# Patient Record
Sex: FEMALE | Race: WHITE
[De-identification: names, ages, dates, MRNs, and addresses within clinical notes are randomized per-mention and may not be internally consistent; named-entity substitution may affect disease eponyms.]

---

## 2022-11-13 ENCOUNTER — HOSPITAL ENCOUNTER (EMERGENCY)
Dept: HOSPITAL 97 - ER | Age: 42
Discharge: HOME | End: 2022-11-13
Payer: COMMERCIAL

## 2022-11-13 VITALS — SYSTOLIC BLOOD PRESSURE: 115 MMHG | OXYGEN SATURATION: 100 % | DIASTOLIC BLOOD PRESSURE: 74 MMHG | TEMPERATURE: 98.5 F

## 2022-11-13 DIAGNOSIS — S93.621A: Primary | ICD-10-CM

## 2022-11-13 PROCEDURE — 99284 EMERGENCY DEPT VISIT MOD MDM: CPT

## 2022-11-13 NOTE — XMS REPORT
Continuity of Care Document

                          Created on:2022



Patient:NOVA BRIGGS

Sex:Female

:1980

External Reference #:[id number]





Demographics







                          Address                   [address] [place] [address]

 

                          Home Phone                [phone]

 

                          Work Phone                [phone]

 

                          Mobile Phone              [phone]

 

                          Email Address             [email or web address]

 

                          Preferred Language        English

 

                          Marital Status            Unknown

 

                          Latter day Affiliation     Unknown

 

                          Race                      Unknown

 

                          Ethnic Group              Not  or 









Author







                          Organization              [facility]

t

 

                          Address                   [address]



                                                    [place] [address]

 

                          Phone                     [phone]









Support







                Name            Relationship    Address         Phone

 

                LINDSAY DE ANDA              [address] [phone]



                                                [place] [address] 

 

                LINDSAY DE ANDA              [address] [phone]



                                                [place] [address] 









Care Team Providers







                    Name                Role                Phone

 

                    Silvia Alex  Primary Care Physician [phone]

 

                    Olya Ledesma     Attending Clinician Unavailable

 

                    Only, Anthony Db Test   Attending Clinician Unavailable

 

                    Unknown, Attending  Attending Clinician Unavailable

 

                    Silvia Alex  Attending Clinician [phone]

 

                    Lab, Ad[facility] Fam P[facility] I  Attending Clinician Unavailable

 

                    SILVIA BABIN      Attending Clinician Unavailable

 

                    Provider, [facility] Attending Clinician Unavailable

 

                    Olya eLdesma     Admitting Clinician Unavailable









Payers







           Payer Name Policy Type Policy     Effective Date Expiration Date Sour

ce



                                 Number                           

 

           BCBS OF               [id number] 2017            [place]

f



           [facility]                       00:[facility]              [facility]

[id number]                                             [facility]



           -[facility]                                             



           [phone]                                             



           [address]                                                



           [id number] [place] [address] [id number]                                             







Problems







       Condition Condition Condition Status Onset  Resolution Last   Treating Co

mments 

Source



       Name   Details Category        Date   Date   Treatment Clinician        



                                                 Date                 

 

       No known No known Disease                                           Unive

rs



       active active                                                  ity of



       problems problems                                                  [facility]







Allergies, Adverse Reactions, Alerts







       Allergy Allergy Status Severity Reaction(s) Onset  Inactive Treating Comm

ents 

Source



       Name   Type                        Date   Date   Clinician        

 

       No Known DA     Active U                                   [facility]



       Allergie                             -14                        [place]



       s                                  00:00:                      d



                                          00                          [facility]

 

       NO KNOWN Drug   Active                                           [facility]



       ALLERGIE Class                                                   ity of



       S                                                              [facility]







Social History







           Social Habit Start Date Stop Date  Quantity   Comments   Source

 

           Exposure to                       Not sure              [facility]



           SARS-CoV-2 (event)                                             [facility]

 

           Cigarettes smoked 2021                       Univers

ity of



           current (pack per 00:00:00   00:00:00                         [facility]

edical



           day) - Reported                                             Branch

 

           Tobacco use and 2021 Current user            Univers

ity of



           exposure   00:00:00   00:00:00                         [facility]

 

           History of tobacco            2020 Cigarette Smoker            

University of



           use                   00:00:00                         [facility]

 

           Sex Assigned At 1980                       Universit

y of



           Birth      00:00:00   00:00:00                         [facility]









                Smoking Status  Start Date      Stop Date       Source

 

                Former smoker   2021 00:00:00 2021 00:00:00 Universi

ty [facility]

 

                Unknown if ever smoked                                 [facility]

y [facility]







Medications







       Ordered Filled Start  Stop   Current Ordering Indication Dosage Frequency

 Signature

                    Comments            Components          Source



     Medication Medication Date Date Medication? Clinician                (SIG) 

          



     Name Name                                                   

 

     escitalopra            Yes            20mg      Take 20 mg           

Univers



     m oxalate      3-11                               by mouth           ity of



     20 mg      14:08:                               daily.           [place]



     tablet      57                                                [facility]

 

     escitalopra            Yes            20mg      Take 20 mg           

Univers



     m oxalate      3-11                               by mouth           ity of



     20 mg      14:08:                               daily.           [place]



     tablet      57                                                [facility]

 

     escitalopra            Yes            20mg      Take 20 mg           

Univers



     m oxalate      3-11                               by mouth           ity of



     20 mg      14:08:                               daily.           [place]



     tablet      57                                                [facility]

 

     escitalopra            Yes            20mg      Take 20 mg           

Univers



     m oxalate      3-11                               by mouth           ity of



     20 mg      14:08:                               daily.           [place]



     tablet      57                                                [facility]

 

     escitalopra            Yes            20mg      Take 20 mg           

Univers



     m oxalate      3-11                               by mouth           ity of



     20 mg      14:08:                               daily.           [place]



     tablet      57                                                [facility]

 

     escitalopra            Yes            20mg      Take 20 mg           

Univers



     m oxalate      3-11                               by mouth           ity of



     20 mg      14:08:                               daily.           [place]



     tablet      57                                                [facility]

 

     escitalopra            Yes            20mg      Take 20 mg           

Univers



     m oxalate      3-11                               by mouth           ity of



     20 mg      14:08:                               daily.           [place]



     tablet      57                                                Medical



                                                                 Branch

 

     escitalopra            Yes            20mg      Take 20 mg           

Univers



     m oxalate      3-11                               by mouth           ity of



     20 mg      14:08:                               daily.           [place]



     tablet      57                                                Medical



                                                                 Branch

 

     levonorgest      2020-0      Yes                                     Univer

s



     reL 20      6-10                                              ity of



     mcg/24      00:00:                                              [place]



     hours (                                                Medical



     yrs) 52 mg                                                        Branch



     IUD                                                         

 

     levonorgest      2020-0      Yes                                     Univer

s



     reL 20      6-10                                              ity of



     mcg/24      00:00:                                              [place]



     hours (                                                Medical



     yrs) 52 mg                                                        Branch



     IUD                                                         

 

     levonorgest      2020-0      Yes                                     Univer

s



     reL 20      6-10                                              ity of



     mcg/24      00:00:                                              [place]



     hours (                                                Medical



     yrs) 52 mg                                                        Branch



     IUD                                                         

 

     levonorgest      2020-0      Yes                                     Univer

s



     reL 20      6-10                                              ity of



     mcg/24      00:00:                                              [place]



     hours (                                                Medical



     yrs) 52 mg                                                        Branch



     IUD                                                         

 

     levonorgest      2020-0      Yes                                     Univer

s



     reL 20      6-10                                              ity of



     mcg/24      00:00:                                              [place]



     hours (                                                Medical



     yrs) 52 mg                                                        Branch



     IUD                                                         

 

     levonorgest      2020-0      Yes                                     Univer

s



     reL 20      6-10                                              ity of



     mcg/24      00:00:                                              [place]



     hours (                                                Medical



     yrs) 52 mg                                                        Branch



     IUD                                                         

 

     levonorgest      2020-0      Yes                                     Univer

s



     reL 20      6-10                                              ity of



     mcg/24      00:00:                                              [place]



     hours (                                                Medical



     yrs) 52 mg                                                        Branch



     IUD                                                         

 

     levonorgest      2020-0      Yes                                     Univer

s



     reL 20      6-10                                              ity of



     mcg/24      00:00:                                              [place]



     hours (                                                Medical



     yrs) 52 mg                                                        Branch



     IUD                                                         

 

     No known                No                                      [facility]



     medications                                                        [facility]







Vital Signs







             Vital Name   Observation Time Observation Value Comments     Source

 

             Systolic blood 2021 14:07:00 105 mm[Hg]                Univer

sity of



             pressure                                            [facility]

 

             Diastolic blood 2021 14:07:00 75 mm[Hg]                 Unive

rsity of



             pressure                                            [facility]

 

             Heart rate   2021 14:07:00 87 /min                   [facility]

 

             Body temperature 2021 14:07:00 36.89 Mary Anne                 Univ

ersity [facility]

 

             Body height  2021 14:07:00 152.4 cm                  [facility]

 

             Body weight  2021 14:07:00 57.153 kg                 Universi

ty of



                                                                 [place] [facility]



                                                                 [place]

 

             BMI          2021 14:07:00 24.61 kg/m2               Universi

ty [facility]

 

             Systolic blood 2020 19:13:00 134 mm[Hg]                Univer

sity of



             pressure                                            [facility]

 

             Diastolic blood 2020 19:13:00 87 mm[Hg]                 Unive

rsity of



             pressure                                            [facility]

 

             Heart rate   2020 19:13:00 105 /min                  Universi

ty [facility]

 

             Respiratory rate 2020 19:13:00 16 /min                   Univ

ersity of



                                                                 [facility]

 

             Body height  2020 19:13:00 152.4 cm                  Universi

[facility]

 

             Body weight  2020 19:13:00 72.576 kg                 Universi

[facility]

 

             BMI          2020 19:13:00 31.25 kg/m2               Universi

[facility]

 

             Oxygen saturation in 2020 19:13:00 97 /min                   

[facility]



             Arterial blood by                                        [place] [facility]

effie



             Pulse oximetry                                        Branch







Procedures







                Procedure       Date / Time     Performing Clinician Source



                                Performed                       

 

                COMP. METABOLIC PANEL 2021 15:06:00 Silvia Babin  [facility]



                ([id number])                                         Medical [place]

 

                CBC WITH DIFF   2021 15:06:00 Selena [facility]

 

                VITAMIN B12, LEVEL 2021 15:06:00 Silvia Babin  [facility]

 

                THYROID STIMULATING 2021 15:06:00 Selena Silvia  [facility]

[facility] [place]



                HORMONE                                         [facility]

 

                LIPID PANEL     2021 15:06:00 Selena [facility]



                ([id number])(TOTAL                                   Medical Branch



                CHOLESTEROL,                                    



                TRIGLYCERIDES, HDL)                                 

 

                VITAMIN D, 25-OH 2021 15:06:00 [facility]







Encounters







        Start   End     Encounter Admission Attending Care    Care    Encounter 

Source



        Date/Time Date/Time Type    Type    Clinicians Facility Department ID   

   

 

        2022 Outpatient MAGI Urias    [id number]

[id number] [facility]



        08:00:00 08:00:00                 Olyamarielos Medel      [facility]

 

        2021 Laboratory         Only, Ang Db Test [facility]    1.2.8

[id number] 

Univers



        12:43:16 12:53:16 Only            Unknown, Attending Health  [id number]

         ity of



                                                [place] [id number]         Manpreet

as



                                                Rex?Blea [phone]         Me

renea farmer    [address]             [facility]                 



                                                Office                  



                                                Building                 

 

        2021 Outpatient R               [facility]    [id number]

035 Univers



        12:45:00 12:45:00                                                 ity of



                                                                        [facility]

 

        2021 Outpatient ZAYRA      Ledesma, [facility]    [id number]

[id number] [facility]



        12:00:00 12:00:00                 [place] [id number]      Woman'

s



                                                                        [facility]

 

        2021-04-15 2021-04-15 Patient         Selena,  [facility]    [id number]

03 Univers



        00:00:00 00:00:00 Secure Msg         Silvia [facility]  [id number]        

 ity of



                                                [place] [id number]         Manpreet

as



                                                Professio [phone]         Me

dical



                                                nal     [address]             [place]



                                                Office                  



                                                Building                 



                                                One                     

 

        2021 Technician         Lab, [facility] F[facility] P[facility] I [facility]    1.2.

[id number] 

Univers



        08:00:38 08:10:59 Visit           Silvia [facility]  [id number]    

     ity of



                                                [place] [id number]         Manpreet

as



                                                Professio [phone]         Me

dical



                                                nal     [address]             [place]



                                                Office                  



                                                Building                 



                                                One                     

 

        2021 Outpatient R       SELENA,  [facility]    [id number]

298 Univers



        08:00:00 08:00:00                 SILVIA                         ity of



                                                                        [facility]

 

        2021 Technician         Lab, [facility] Fam Pob I [facility]    1.2.

[id number] 

Univers



        08:49:59 09:11:22 Visit           Silvia [facility]  [id number]    

     ity of



                                                [place] [id number]         Manpreet

as



                                                Professio [phone]         Me

dical



                                                nal     [address]             [place]



                                                Office                  



                                                Building                 



                                                One                     

 

        2021 Office          Selena  [facility]    [id number]

14 Univers



        08:02:54 08:50:14 Visit           Silvia Snyder  350.1.13.10         it

y of



                                                [place] [id number]         Manpreet

as



                                                Professio [phone]         Me

dical



                                                nal     [address]             [place]



                                                Office                  



                                                [facility]                 



                                                One                     

 

        2021 [facility]RIANNA       [facility]    [id number]

838 Univers



        08:00:00 08:00:00                 SILVIA barrera [facility]

 

        [facility]    [id number]

646 Univers



        10:00:00 10:00:00                 SILVIA                         irvin [facility]

 

        2020 Urgent          Provider, [facility]    

[id number] 

[id number]                                Univers



        13:01:19 13:42:24 Care            Silvia Babin  350.1.13.10    

     ity rosalee



                                                [place] [id number]         Manpreet

as



                                                Professio [phone]         Me

dical



                                                nal     [address]             [place]



                                                Office                  



                                                [facility]                 



                                                One                     

 

        2020 [facility]    [id number]

975 Univers



        13:00:00 13:00:00                 [facility]







Results







           Test Description Test Time  Test Comments Results    Result Comments 

Source









                    VITAMIN B12, LEVEL  2021 22:47:10 









                      Test Item  Value      Reference Range Interpretation Comme

nts









             VIT B12 (test code = 9642947650) 713 pg/mL    240-930              

     

 

             KIERRA (test code = KIERRA) Biotin has been reported to cause            

               



                          a positive bias, interpret results                    

       



                          relative to patient's use of                          

 



                          biotin.                                

 

             Lab Interpretation (test code = Normal                             

    



             72558-6)                                            



[facility] 95-PI0060-70-11 21:31:26





             Test Item    Value        Reference Range Interpretation Comments

 

             VIT D 25OH (test code = 52 ng/mL     25-80                     



             01443-0)                                            

 

             KIERRA (test code = KIERRA) Deficiency: <20                           



                          ng/mLInsufficiency                           



                          : 20-24                                



                          ng/mLOptimal:                           



                          25-80 ng/mL                            

 

             Lab Interpretation (test Normal                                 



             code = 05411-7)                                        



[facility] [id number] 17:35:59





             Test Item    Value        Reference Range Interpretation Comments

 

             TSH (test code =              See_Comment  L             [Automated

 message]



             [id number])                                         The system Software 2000



                                                                 generated this 

result



                                                                 transmitted ref

erence



                                                                 range: 0.45 - 4

.70



                                                                 mIU/L. The refe

rence



                                                                 range was not u

sed to



                                                                 interpret this 

result



                                                                 as normal/abnor

mal.

 

             Lab Interpretation (test Abnormal                               



             code = 09552-9)                                        



[facility]. METABOLIC PANEL ([id number])2021 
17:14:57





             Test Item    Value        Reference Range Interpretation Comments

 

             NA (test code = 139 mmol/L   135-145                   



             9919280023)                                         

 

             K (test code = 4.6 mmol/L   3.5-5.0                   



             9004545983)                                         

 

             CL (test code = 99 mmol/L                        



             8108810197)                                         

 

             CO2 TOTAL (test code = 31 mmol/L    23-31                     



             0765750109)                                         

 

             AGAP (test code =              2-16                      



             5663741861)                                         

 

             BUN (test code = 7 mg/dL      7-23                      



             9639252565)                                         

 

             GLUCOSE (test code = 80 mg/dL                         



             4067727566)                                         

 

             CREATININE (test code = 0.49 mg/dL   0.50-1.04    L            



             3862357070)                                         

 

             TOTAL BILI (test code = 1.1 mg/dL    0.1-1.1                   



             3836285452)                                         

 

             CALCIUM (test code = 9.6 mg/dL    8.6-10.6                  



             5536593841)                                         

 

             T PROTEIN (test code = 7.0 g/dL     6.3-8.2                   



             1074999705)                                         

 

             ALBUMIN (test code = 4.5 g/dL     3.5-5.0                   



             7643861480)                                         

 

             ALK PHOS (test code = 87 U/L                           



             2860409443)                                         

 

             ALTv (test code = 26 U/L       5-35                      



             1742-6)                                             

 

             AST(SGOT) (test code = 29 U/L       13-40                     



             7868188509)                                         

 

             eGFR Calculation              mL/min/1.73m2              



             (Non-)                                        



             (test code =                                        



             1389626772)                                         

 

             eGFR Calculation              mL/min/1.73m2              



             (African American)                                        



             (test code =                                        



             9444452823)                                         

 

             KIERRA (test code = KIERRA) Association of                           



                          Glomerular Filtration                           



                          Rate (GFR) and Staging                           



                          of Kidney Disease*                           



                          +---------------------                           



                          --+-------------------                           



                          --+-------------------                           



                          ------+| GFR                           



                          (mL/min/1.73 m2) ?|                           



                          With Kidney Damage ?|                           



                          ?Without Kidney                           



                          Damage+---------------                           



                          --------+-------------                           



                          --------+-------------                           



                          ------------+| ?>90 ?                           



                          ? ? ? ? ? ? ? ?|                           



                          ?Stage one ? ? ? ? ?|                           



                          ? Normal ? ? ? ? ? ? ?                           



                          ?+--------------------                           



                          ---+------------------                           



                          ---+------------------                           



                          -------+| ?60-89 ? ? ?                           



                          ? ? ? ? ?| ?Stage two                           



                          ? ? ? ? ?| ? Decreased                           



                          GFR ? ? ? ?                            



                          +---------------------                           



                          --+-------------------                           



                          --+-------------------                           



                          ------+| ?30-59 ? ? ?                           



                          ? ? ? ? ?| ?Stage                           



                          three ? ? ? ?| ? Stage                           



                          three ? ? ? ? ?                           



                          +---------------------                           



                          --+-------------------                           



                          --+-------------------                           



                          ------+| ?15-29 ? ? ?                           



                          ? ? ? ? ?| ?Stage four                           



                          ? ? ? ? | ? Stage four                           



                          ? ? ? ? ?                              



                          ?+--------------------                           



                          ---+------------------                           



                          ---+------------------                           



                          -------+| ?<15 (or                           



                          dialysis) ? ?| ?Stage                           



                          five ? ? ? ? | ? Stage                           



                          five ? ? ? ? ?                           



                          ?+--------------------                           



                          ---+------------------                           



                          ---+------------------                           



                          -------+ *Each stage                           



                          assumes the associated                           



                          GFR level has been in                           



                          effect for at least                           



                          three months. ?Stages                           



                          1 to 5, with or                           



                          without kidney                           



                          disease, indicate                           



                          chronic kidney                           



                          disease. Notes:                           



                          Determination of                           



                          stages one and two                           



                          (with eGFR                             



                          >59mL/min/1.73 m2)                           



                          requires estimation of                           



                          kidney damage for at                           



                          least three months as                           



                          defined by structural                           



                          or functional                           



                          abnormalities of the                           



                          kidney, manifested by                           



                          either:Pathological                           



                          abnormalities or                           



                          Markers of kidney                           



                          damage (including                           



                          abnormalities in the                           



                          composition of the                           



                          blood or urine or                           



                          abnormalities in                           



                          imaging tests).                           

 

             Lab Interpretation Abnormal                               



             (test code = 31381-6)                                        



[facility]. METABOLIC PANEL ([id number])2021 
17:14:57





             Test Item    Value        Reference Range Interpretation Comments

 

             NA (test code = 139 mmol/L   135-145                   



             5435227784)                                         

 

             K (test code = 4.6 mmol/L   3.5-5.0                   



             8872038029)                                         

 

             CL (test code = 99 mmol/L                        



             6775661650)                                         

 

             CO2 TOTAL (test code = 31 mmol/L    23-31                     



             5351985177)                                         

 

             AGAP (test code =              2-16                      



             0632292242)                                         

 

             BUN (test code = 7 mg/dL      7-23                      



             4261475707)                                         

 

             GLUCOSE (test code = 80 mg/dL                         



             5260793621)                                         

 

             CREATININE (test code = 0.49 mg/dL   0.50-1.04    L            



             4013394368)                                         

 

             TOTAL BILI (test code = 1.1 mg/dL    0.1-1.1                   



             4716062860)                                         

 

             CALCIUM (test code = 9.6 mg/dL    8.6-10.6                  



             9071173047)                                         

 

             T PROTEIN (test code = 7.0 g/dL     6.3-8.2                   



             6091955893)                                         

 

             ALBUMIN (test code = 4.5 g/dL     3.5-5.0                   



             8981041670)                                         

 

             ALK PHOS (test code = 87 U/L                           



             5116760150)                                         

 

             ALTv (test code = 26 U/L       5-35                      



             1742-6)                                             

 

             AST(SGOT) (test code = 29 U/L       13-40                     



             5819624522)                                         

 

             eGFR Calculation              mL/min/1.73m2              



             (Non-)                                        



             (test code =                                        



             1130400912)                                         

 

             eGFR Calculation              mL/min/1.73m2              



             (African American)                                        



             (test code =                                        



             4520008422)                                         

 

             KIERRA (test code = KIERRA) Association of                           



                          Glomerular Filtration                           



                          Rate (GFR) and Staging                           



                          of Kidney Disease*                           



                          +---------------------                           



                          --+-------------------                           



                          --+-------------------                           



                          ------+| GFR                           



                          (mL/min/1.73 m2) ?|                           



                          With Kidney Damage ?|                           



                          ?Without Kidney                           



                          Damage+---------------                           



                          --------+-------------                           



                          --------+-------------                           



                          ------------+| ?>90 ?                           



                          ? ? ? ? ? ? ? ?|                           



                          ?Stage one ? ? ? ? ?|                           



                          ? Normal ? ? ? ? ? ? ?                           



                          ?+--------------------                           



                          ---+------------------                           



                          ---+------------------                           



                          -------+| ?60-89 ? ? ?                           



                          ? ? ? ? ?| ?Stage two                           



                          ? ? ? ? ?| ? Decreased                           



                          GFR ? ? ? ?                            



                          +---------------------                           



                          --+-------------------                           



                          --+-------------------                           



                          ------+| ?30-59 ? ? ?                           



                          ? ? ? ? ?| ?Stage                           



                          three ? ? ? ?| ? Stage                           



                          three ? ? ? ? ?                           



                          +---------------------                           



                          --+-------------------                           



                          --+-------------------                           



                          ------+| ?15-29 ? ? ?                           



                          ? ? ? ? ?| ?Stage four                           



                          ? ? ? ? | ? Stage four                           



                          ? ? ? ? ?                              



                          ?+--------------------                           



                          ---+------------------                           



                          ---+------------------                           



                          -------+| ?<15 (or                           



                          dialysis) ? ?| ?Stage                           



                          five ? ? ? ? | ? Stage                           



                          five ? ? ? ? ?                           



                          ?+--------------------                           



                          ---+------------------                           



                          ---+------------------                           



                          -------+ *Each stage                           



                          assumes the associated                           



                          GFR level has been in                           



                          effect for at least                           



                          three months. ?Stages                           



                          1 to 5, with or                           



                          without kidney                           



                          disease, indicate                           



                          chronic kidney                           



                          disease. Notes:                           



                          Determination of                           



                          stages one and two                           



                          (with eGFR                             



                          >59mL/min/1.73 m2)                           



                          requires estimation of                           



                          kidney damage for at                           



                          least three months as                           



                          defined by structural                           



                          or functional                           



                          abnormalities of the                           



                          kidney, manifested by                           



                          either:Pathological                           



                          abnormalities or                           



                          Markers of kidney                           



                          damage (including                           



                          abnormalities in the                           



                          composition of the                           



                          blood or urine or                           



                          abnormalities in                           



                          imaging tests).                           

 

             Lab Interpretation Abnormal                               



             (test code = 60557-0)                                        



[id number]TOTAL CHOLESTEROL, 
TRIGLYCERIDES, HDL)2021 17:14:57





             Test Item    Value        Reference Range Interpretation Comments

 

             CHOL (test code = 221 mg/dL    120-200      H            



             7409769884)                                         

 

             HDL (test code = 49 mg/dL     >50          L            



             2877319836)                                         

 

             HDLC RATIO (test code =              See_Comment                [Au

tomated message]



             2354913700)                                         The system Software 2000



                                                                 generated this



                                                                 result transmit

balta



                                                                 reference range

:



                                                                 <=4.5. The refe

rence



                                                                 range was not u

sed



                                                                 to interpret th

is



                                                                 result as



                                                                 normal/abnormal

.

 

             TRIG (test code = 124 mg/dL                        



             9095649496)                                         

 

             LDL CHOL (test code = 147 mg/dL    See_Comment                [Auto

mated message]



             30529-1)                                            The system Software 2000



                                                                 generated this



                                                                 result transmit

balta



                                                                 reference range

:



                                                                 <=160. The refe

rence



                                                                 range was not u

sed



                                                                 to interpret th

is



                                                                 result as



                                                                 normal/abnormal

.

 

             VLDL (test code = 25 mg/dL     5-60                      



             6466285613)                                         

 

             Lab Interpretation (test Abnormal                               



             code = 96967-8)                                        



[id number] 16:54:15





             Test Item    Value        Reference Range Interpretation Comments

 

             WBC (test code =              See_Comment                [Automated



             4290-2)                                             message] The sy

stem



                                                                 which generated



                                                                 this result



                                                                 transmitted



                                                                 reference range

:



                                                                 4.30 - 11.10



                                                                 10*3/?L. The



                                                                 reference range

 was



                                                                 not used to



                                                                 interpret this



                                                                 result as



                                                                 normal/abnormal

.

 

             RBC (test code =              See_Comment                [Automated



             599-8)                                              message] The sy

stem



                                                                 which generated



                                                                 this result



                                                                 transmitted



                                                                 reference range

:



                                                                 3.93 - 5.25



                                                                 10*6/?L. The



                                                                 reference range

 was



                                                                 not used to



                                                                 interpret this



                                                                 result as



                                                                 normal/abnormal

.

 

             HGB (test code = 14.9 g/dL    11.6-15.0                 



             718-7)                                              

 

             HCT (test code = 45.3 %       35.7-45.2    H            



             4544-3)                                             

 

             MCV (test code = 91.0 fL      80.6-95.5                 



             787-2)                                              

 

             MCH (test code = 29.9 pg      25.9-32.8                 



             785-6)                                              

 

             MCHC (test code = 32.9 g/dL    31.6-35.1                 



             786-4)                                              

 

             RDW-SD (test code = 44.3 fL      39.0-49.9                 



             36227-0)                                            

 

             RDW-CV (test code = 13.2 %       12.0-15.5                 



             788-0)                                              

 

             PLT (test code =              See_Comment                [Automated



             777-3)                                              message] The sy

stem



                                                                 which generated



                                                                 this result



                                                                 transmitted



                                                                 reference range

:



                                                                 166 - 358 10*3/

?L.



                                                                 The reference r

olga



                                                                 was not used to



                                                                 interpret this



                                                                 result as



                                                                 normal/abnormal

.

 

             MPV (test code = 11.3 fL      9.5-12.9                  



             47642-1)                                            

 

             NRBC/100 WBC (test              See_Comment                [Automat

ed



             code = [id number])                                        message] 

The system



                                                                 which generated



                                                                 this result



                                                                 transmitted



                                                                 reference range

:



                                                                 0.0 - 10.0 /100



                                                                 WBCs. The refer

ence



                                                                 range was not u

sed



                                                                 to interpret th

is



                                                                 result as



                                                                 normal/abnormal

.

 

             NRBC x10^3 (test code <0.01        See_Comment                [Auto

mated



             = [id number])                                        message] The s

ystem



                                                                 which generated



                                                                 this result



                                                                 transmitted



                                                                 reference range

:



                                                                 10*3/?L. The



                                                                 reference range

 was



                                                                 not used to



                                                                 interpret this



                                                                 result as



                                                                 normal/abnormal

.

 

             GRAN MAT (NEUT) % 51.7 %                                 



             (test code = 770-8)                                        

 

             IMM GRAN % (test code 0.40 %                                 



             = [id number])                                        

 

             LYMPH % (test code = 39.3 %                                 



             736-9)                                              

 

             MONO % (test code = 5.7 %                                  



             5905-5)                                             

 

             EOS % (test code = 2.6 %                                  



             713-8)                                              

 

             BASO % (test code = 0.3 %                                  



             706-2)                                              

 

             GRAN MAT x10^3(ANC) 4.02 10*3/uL 1.88-7.09                 



             (test code =                                        



             [id number])                                         

 

             IMM GRAN x10^3 (test 0.03 10*3/uL 0.00-0.06                 



             code = [id number])                                        

 

             LYMPH x10^3 (test code 3.05 10*3/uL 1.32-3.29                 



             = 731-0)                                            

 

             MONO x10^3 (test code 0.44 10*3/uL 0.33-0.92                 



             = 742-7)                                            

 

             EOS x10^3 (test code = 0.20 10*3/uL 0.03-0.39                 



             711-2)                                              

 

             BASO x10^3 (test code <0.03        0.01-0.07                 



             = 704-7)                                            

 

             Lab Interpretation Abnormal                               



             (test code = 37811-9)                                        



[facility] WITH [id number] 16:54:15





             Test Item    Value        Reference Range Interpretation Comments

 

             WBC (test code =              See_Comment                [Automated



             6690-2)                                             message] The sy

stem



                                                                 which generated



                                                                 this result



                                                                 transmitted



                                                                 reference range

:



                                                                 4.30 - 11.10



                                                                 10*3/?L. The



                                                                 reference range

 was



                                                                 not used to



                                                                 interpret this



                                                                 result as



                                                                 normal/abnormal

.

 

             RBC (test code =              See_Comment                [Automated



             789-8)                                              message] The sy

stem



                                                                 which generated



                                                                 this result



                                                                 transmitted



                                                                 reference range

:



                                                                 3.93 - 5.25



                                                                 10*6/?L. The



                                                                 reference range

 was



                                                                 not used to



                                                                 interpret this



                                                                 result as



                                                                 normal/abnormal

.

 

             HGB (test code = 14.9 g/dL    11.6-15.0                 



             718-7)                                              

 

             HCT (test code = 45.3 %       35.7-45.2    H            



             4544-3)                                             

 

             MCV (test code = 91.0 fL      80.6-95.5                 



             787-2)                                              

 

             MCH (test code = 29.9 pg      25.9-32.8                 



             785-6)                                              

 

             MCHC (test code = 32.9 g/dL    31.6-35.1                 



             786-4)                                              

 

             RDW-SD (test code = 44.3 fL      39.0-49.9                 



             63641-2)                                            

 

             RDW-CV (test code = 13.2 %       12.0-15.5                 



             788-0)                                              

 

             PLT (test code =              See_Comment                [Automated



             777-3)                                              message] The sy

stem



                                                                 which generated



                                                                 this result



                                                                 transmitted



                                                                 reference range

:



                                                                 166 - 358 10*3/

?L.



                                                                 The reference r

olga



                                                                 was not used to



                                                                 interpret this



                                                                 result as



                                                                 normal/abnormal

.

 

             MPV (test code = 11.3 fL      9.5-12.9                  



             68154-3)                                            

 

             NRBC/100 WBC (test              See_Comment                [Automat

ed



             code = [id number])                                        message] 

The system



                                                                 which generated



                                                                 this result



                                                                 transmitted



                                                                 reference range

:



                                                                 0.0 - 10.0 /100



                                                                 WBCs. The refer

ence



                                                                 range was not u

sed



                                                                 to interpret th

is



                                                                 result as



                                                                 normal/abnormal

.

 

             NRBC x10^3 (test code <0.01        See_Comment                [Auto

mated



             = [id number])                                        message] The s

ystem



                                                                 which generated



                                                                 this result



                                                                 transmitted



                                                                 reference range

:



                                                                 10*3/?L. The



                                                                 reference range

 was



                                                                 not used to



                                                                 interpret this



                                                                 result as



                                                                 normal/abnormal

.

 

             GRAN MAT (NEUT) % 51.7 %                                 



             (test code = 770-8)                                        

 

             IMM GRAN % (test code 0.40 %                                 



             = [id number])                                        

 

             LYMPH % (test code = 39.3 %                                 



             736-9)                                              

 

             MONO % (test code = 5.7 %                                  



             5905-5)                                             

 

             EOS % (test code = 2.6 %                                  



             713-8)                                              

 

             BASO % (test code = 0.3 %                                  



             706-2)                                              

 

             GRAN MAT x10^3(ANC) 4.02 10*3/uL 1.88-7.09                 



             (test code =                                        



             9350110093)                                         

 

             IMM GRAN x10^3 (test 0.03 10*3/uL 0.00-0.06                 



             code = [id number])                                        

 

             LYMPH x10^3 (test code 3.05 10*3/uL 1.32-3.29                 



             = 731-0)                                            

 

             MONO x10^3 (test code 0.44 10*3/uL 0.33-0.92                 



             = 742-7)                                            

 

             EOS x10^3 (test code = 0.20 10*3/uL 0.03-0.39                 



             711-2)                                              

 

             BASO x10^3 (test code <0.03        0.01-0.07                 



             = 704-7)                                            

 

             Lab Interpretation Abnormal                               



             (test code = 94582-7)                                        



[facility]

## 2022-11-13 NOTE — RAD REPORT
EXAM DESCRIPTION:  RAD - Foot Right 3 View - 11/13/2022 10:34 am

 

CLINICAL HISTORY:  PAIN

 

COMPARISON:  No comparisons

 

FINDINGS/IMPRESSION:  No acute fracture. No malalignment. No significant focal degenerative changes.

## 2022-11-13 NOTE — EDPHYS
Physician Documentation                                                                           

 [facility]                                                                 

Name: Thelma Haley                                                                             

Age: 42 yrs                                                                                       

Sex: Female                                                                                       

: 1980                                                                                   

MRN: [id number]                                                                                   

Arrival Date: 2022                                                                          

Time: 09:03                                                                                       

Account#: [id number]                                                                            

Bed 11                                                                                            

Private MD:                                                                                       

ASGRARIO Physician Ivan Bautista                                                                      

HPI:                                                                                              

                                                                                             

10:33 This 42 yrs old  Female presents to ER via Wheelchair with complaints of Foot  chastity 

      Pain - right.                                                                               

10:33 The patient presents with decreased range of motion, pain, that is acute. The           chastity 

      complaints affect the right foot, dorsum of right foot. Context: The problem was            

      sustained inside, resulted from the patient falling, while walking, Mechanism of            

      Injury: Inversion the patient can partially bear weight, the patient is able to             

      ambulate. Onset: The symptoms/episode began/occurred last night. Modifying factors: The     

      symptoms are alleviated by nothing, the symptoms are aggravated by weight bearing,          

      movement, wearing shoes. Associated signs and symptoms: Pertinent positives: swelling.      

      Severity of symptoms: At their worst the symptoms were moderate, in the emergency           

      department the symptoms are unchanged. The patient has not experienced similar symptoms     

      in the past.                                                                                

                                                                                                  

OB/GYN:                                                                                           

09:21 LMP N/A - Birth control method                                                          vg1 

                                                                                                  

Historical:                                                                                       

- Allergies:                                                                                      

09:21 No Known Allergies;                                                                     vg1 

- Home Meds:                                                                                      

09:21 Lexapro Oral [Active];                                                                  vg1 

- PMHx:                                                                                           

09:21 Anxiety; Depressive disorder;                                                           vg1 

- PSHx:                                                                                           

09:21 gastric;                                                                                vg1 

                                                                                                  

- Immunization history:: Client reports receiving the 2nd dose of the Covid vaccine.              

- Social history:: Smoking status: Patient reports the use of cigarette tobacco                   

  products, denies chronic smoking, but will smoke occasionally, Reported history of              

  juuling and/or vaping.                                                                          

- Family history:: not pertinent.                                                                 

                                                                                                  

                                                                                                  

ROS:                                                                                              

10:33 Constitutional: Negative for fever, chills, and weight loss, Eyes: Negative for injury, chastity 

      pain, redness, and discharge, ENT: Negative for injury, pain, and discharge, Neck:          

      Negative for injury, pain, and swelling, Cardiovascular: Negative for chest pain,           

      palpitations, and edema, Respiratory: Negative for shortness of breath, cough,              

      wheezing, and pleuritic chest pain, Abdomen/GI: Negative for abdominal pain, nausea,        

      vomiting, diarrhea, and constipation, Back: Negative for injury and pain, : Negative      

      for injury, bleeding, discharge, and swelling, Skin: Negative for injury, rash, and         

      discoloration, Neuro: Negative for headache, weakness, numbness, tingling, and seizure,     

      Psych: Negative for depression, anxiety, suicide ideation, homicidal ideation, and          

      hallucinations, Allergy/Immunology: Negative for hives, rash, and allergies, Endocrine:     

      Negative for neck swelling, polydipsia, polyuria, polyphagia, and marked weight             

      changes, Hematologic/Lymphatic: Negative for swollen nodes, abnormal bleeding, and          

      unusual bruising.                                                                           

10:33 MS/extremity: Positive for decreased range of motion, pain, swelling, tenderness, of        

      the dorsum of right foot.                                                                   

                                                                                                  

Exam:                                                                                             

10:33 Constitutional:  This is a well developed, well nourished patient who is awake, alert,  chastity 

      and in no acute distress. Head/Face:  Normocephalic, atraumatic. Eyes:  Pupils equal        

      round and reactive to light, extra-ocular motions intact.  Lids and lashes normal.          

      Conjunctiva and sclera are non-icteric and not injected.  Cornea within normal limits.      

      Periorbital areas with no swelling, redness, or edema. ENT:  Nares patent. No nasal         

      discharge, no septal abnormalities noted.  Tympanic membranes are normal and external       

      auditory canals are clear.  Oropharynx with no redness, swelling, or masses, exudates,      

      or evidence of obstruction, uvula midline.  Mucous membranes moist. Neck:  Trachea          

      midline, no thyromegaly or masses palpated, and no cervical lymphadenopathy.  Supple,       

      full range of motion without nuchal rigidity, or vertebral point tenderness.  No            

      Meningismus. Chest/axilla:  Normal chest wall appearance and motion.  Nontender with no     

      deformity.  No lesions are appreciated. Cardiovascular:  Regular rate and rhythm with a     

      normal S1 and S2.  No gallops, murmurs, or rubs.  Normal PMI, no JVD.  No pulse             

      deficits. Respiratory:  Lungs have equal breath sounds bilaterally, clear to                

      auscultation and percussion.  No rales, rhonchi or wheezes noted.  No increased work of     

      breathing, no retractions or nasal flaring. Abdomen/GI:  Soft, non-tender, with normal      

      bowel sounds.  No distension or tympany.  No guarding or rebound.  No evidence of           

      tenderness throughout. Back:  No spinal tenderness.  No costovertebral tenderness.          

      Full range of motion. Skin:  Warm, dry with normal turgor.  Normal color with no            

      rashes, no lesions, and no evidence of cellulitis. Neuro:  Awake and alert, GCS 15,         

      oriented to person, place, time, and situation.  Cranial nerves II-XII grossly intact.      

      Motor strength 5/5 in all extremities.  Sensory grossly intact.  Cerebellar exam            

      normal.  Normal gait. Psych:  Awake, alert, with orientation to person, place and time.     

       Behavior, mood, and affect are within normal limits.                                       

10:33 Musculoskeletal/extremity: ROM: limited active range of motion, limited passive range       

      of motion, Circulation is intact in all extremities. Sensation intact. Compartment          

      Syndrome exam of affected extremity: is normal. DVT Exam: negative Homans' sign noted       

      on exam, no appreciated bluish discoloration, no erythema, no increased warmth, pain,       

      swelling, tenderness.                                                                       

                                                                                                  

Vital Signs:                                                                                      

09:19  / 74; Pulse 99; Resp 17; Temp 98.5; Pulse Ox 100% ; Weight 54.43 kg; Height 5    vg1 

      ft. 0 in. (152.40 cm); Pain 7/10;                                                           

09:19 Body Mass Index 23.44 (54.43 kg, 152.40 cm)                                             vg1 

                                                                                                  

MDM:                                                                                              

09:23 Patient medically screened.                                                             chastity 

10:37 Differential diagnosis: fracture, sprain, arthritis. Data reviewed: vital signs, nurses [facility] 

      notes, radiologic studies, plain films. Data interpreted: Cardiac monitor: not              

      applicable for this patient encounter. rate is 99 beats/min, rhythm is regular, Pulse       

      oximetry: on room air is 100 %. Test interpretation: by ED physician or midlevel            

      provider: plain radiologic studies. Counseling: I had a detailed discussion with the        

      patient and/or guardian regarding: the historical points, exam findings, and any            

      diagnostic results supporting the discharge/admit diagnosis, lab results, radiology         

      results, the need for outpatient follow up, for definitive care, a orthopedic surgeon.      

                                                                                                  

                                                                                             

09:26 Order name: XRAY Foot RIGHT 3 View                                                      vg1 

                                                                                             

09:33 Order name: Ice pack; Complete Time: 09:42                                              chastity 

                                                                                             

10:33 Order name: Walking boot: short; Complete Time: 10:46                                   chastity 

                                                                                             

10:44 Order name: Crutches; Complete Time: 10:56                                              chastity 

                                                                                                  

Administered Medications:                                                                         

09:41 Drug: Motrin (ibuprofen) 400 mg Route: PO;                                              ss  

11:00 Follow up: Response: No adverse reaction                                                ss  

10:38 Drug: Norco (HYDROcodone-acetaminophen) 10 mg-325 mg 1 tabs Route: PO;                  ss  

10:56 Follow up: Response: Medication administered at discharge.                                

                                                                                                  

                                                                                                  

Disposition Summary:                                                                              

22 10:45                                                                                    

Discharge Ordered                                                                                 

      Location: Home                                                                          chastity 

      Problem: new                                                                            chastity 

      Symptoms: have improved                                                                 chastity 

      Condition: Stable                                                                       chastity 

      Diagnosis                                                                                   

        - Sprain of tarsometatarsal ligament of right foot                                    chastity 

        - Sprain of foot                                                                      chastity 

      Followup:                                                                               chastity 

        - With: Private Physician                                                                  

        - When: 2 - 3 days                                                                         

        - Reason: Recheck today's complaints, Continuance of care, Re-evaluation by your           

      physician                                                                                   

      Followup:                                                                               chastity 

        - With: Ashwin Kaur MD                                                              

        - When: 2 - 3 days                                                                         

        - Reason: Recheck today's complaints, Re-evaluation by your physician                      

      Discharge Instructions:                                                                     

        - Discharge Summary Sheet                                                             chastity 

        - Foot Sprain                                                                         chastity 

        - Foot Pain                                                                           chastity 

      Forms:                                                                                      

        - Medication Reconciliation Form                                                      chastity 

        - Thank You Letter                                                                    chastity 

        - Antibiotic Education                                                                chastity 

        - Prescription Opioid Use                                                             chastity 

      Prescriptions:                                                                              

        - Diclofenac Sodium 75 mg Oral tablet,delayed release (DR/EC)                              

            - take 1 tablet by ORAL route 2 times per day; 20 tablet; Refills: 0, Product     chastity 

      Selection Permitted                                                                         

        - Tylenol-Codeine #3 300 mg-30 mg Oral                                                     

            - take 2 tablet by ORAL route every 6 hours; 20 tablet; Refills: 0, Product       [facility] 

      Selection Permitted                                                                         

Signatures:                                                                                       

Dispatcher [employer]                           Ivan Hayes MD MD cha Smirch, Shelby, RN                      RN   ss                                                   

Rosa Sosa RN                    RN   vg1                                                  

                                                                                                  

**************************************************************************************************

## 2022-11-13 NOTE — ER
Nurse's Notes                                                                                     

 [facility]                                                                 

Name: Thelma Haley                                                                             

Age: 42 yrs                                                                                       

Sex: Female                                                                                       

: 1980                                                                                   

MRN: [id number]                                                                                   

Arrival Date: 2022                                                                          

Time: 09:03                                                                                       

Account#: [id number]                                                                            

Bed 11                                                                                            

Private MD:                                                                                       

Diagnosis: Sprain of tarsometatarsal ligament of right foot;Sprain of foot                        

                                                                                                  

Presentation:                                                                                     

                                                                                             

09:19 Chief complaint: Patient states: dancing last night around 0100 and rolled Right foot.  vg1 

      Appears to be swollen and warm. Coronavirus screen: Vaccine status: Patient reports         

      receiving the 2nd dose of the covid vaccine. Client denies travel out of the [place] in        

      the last 14 days. Ebola Screen: Patient negative for fever greater than or equal to         

      101.5 degrees Fahrenheit, and additional compatible Ebola Virus Disease symptoms.           

      Initial Sepsis Screen: Does the patient meet any 2 criteria? No. Patient's initial          

      sepsis screen is negative. Does the patient have a suspected source of infection? No.       

      Patient's initial sepsis screen is negative. Risk Assessment: Do you want to hurt           

      yourself or someone else? Patient reports no desire to harm self or others. Onset of        

      symptoms was 2022.                                                             

09:19 Method Of Arrival: Wheelchair                                                           vg1 

09:19 Acuity: NU 4                                                                           vg1 

                                                                                                  

Triage Assessment:                                                                                

09:21 General: Appears uncomfortable, Behavior is cooperative, anxious. Pain: Complains of    vg1 

      pain in right foot. Musculoskeletal: Capillary refill < 3 seconds, in bilateral toes.       

      Swelling present in right foot.                                                             

                                                                                                  

OB/GYN:                                                                                           

09:21 LMP N/A - Birth control method                                                          vg1 

                                                                                                  

Historical:                                                                                       

- Allergies:                                                                                      

09:21 No Known Allergies;                                                                     vg1 

- Home Meds:                                                                                      

09:21 Lexapro Oral [Active];                                                                  vg1 

- PMHx:                                                                                           

09:21 Anxiety; Depressive disorder;                                                           vg1 

- PSHx:                                                                                           

09:21 gastric;                                                                                vg1 

                                                                                                  

- Immunization history:: Client reports receiving the 2nd dose of the Covid vaccine.              

- Social history:: Smoking status: Patient reports the use of cigarette tobacco                   

  products, denies chronic smoking, but will smoke occasionally, Reported history of              

  juuling and/or vaping.                                                                          

- Family history:: not pertinent.                                                                 

                                                                                                  

                                                                                                  

Screenin:22 Abuse screen: Denies threats or abuse. Nutritional screening: No deficits noted.        vg1 

      Tuberculosis screening: No symptoms or risk factors identified. Fall Risk No fall in        

      past 12 months (0 pts). No secondary diagnosis (0 pts). No IV (0 pts). Ambulatory Aid-      

      None/Bed Rest/Nurse Assist (0 pts). Gait- Impaired (20 pts.). Mental Status- Oriented       

      to own ability (0 pts). Total Lucas Fall Scale indicates No Risk (0-24 pts).                

                                                                                                  

Assessment:                                                                                       

10:57 General: Appears in no apparent distress. comfortable, Behavior is calm, cooperative.   ss  

      Neuro: Level of Consciousness is awake, alert. Respiratory: Airway is patent                

      Respiratory effort is even, unlabored, Respiratory pattern is regular, symmetrical.         

      Derm: Skin is intact, is healthy with good turgor, Skin is pink, warm \T\ dry. normal.      

                                                                                                  

Vital Signs:                                                                                      

09:19  / 74; Pulse 99; Resp 17; Temp 98.5; Pulse Ox 100% ; Weight 54.43 kg; Height 5    vg1 

      ft. 0 in. (152.40 cm); Pain 7/10;                                                           

09:19 Body Mass Index 23.44 (54.43 kg, 152.40 cm)                                             vg1 

                                                                                                  

ED Course:                                                                                        

09:03 Patient arrived in ED.                                                                  am2 

09:21 Triage completed.                                                                       vg1 

09:21 Arm band placed on.                                                                     vg1 

09:22 Patient has correct armband on for positive identification. Bed in low position. Call   vg1 

      light in reach. Side rails up X 1. Adult w/ patient.                                        

09:23 Ivan Bautista MD is Attending Physician.                                             [facility]astity 

09:25 Rosa Sosa, RN is Primary Nurse.                                                  vg1 

10:36 XRAY Foot RIGHT 3 View In Process Unspecified.                                          EDMS

10:46 Ashwin Kaur MD is Referral Physician.                                            [facility] 

10:56 No provider procedures requiring assistance completed. Patient did not have IV access   ss  

      during this emergency room visit.                                                           

10:59 Crutch training done. Ortho shoe applied to right foot.                                 ss  

                                                                                                  

Administered Medications:                                                                         

09:41 Drug: Motrin (ibuprofen) 400 mg Route: PO;                                              ss  

11:00 Follow up: Response: No adverse reaction                                                ss  

10:38 Drug: Norco (HYDROcodone-acetaminophen) 10 mg-325 mg 1 tabs Route: PO;                  ss  

10:56 Follow up: Response: Medication administered at discharge.                              ss  

                                                                                                  

                                                                                                  

Medication:                                                                                       

09:22 VIS not applicable for this client.                                                     vg1 

                                                                                                  

Outcome:                                                                                          

10:45 Discharge ordered by MD.                                                                chastity 

10:56 Discharged to home with crutches, with family.                                          [facility]  

10:56 Condition: good                                                                             

10:56 Discharge instructions given to patient, family, Instructed on discharge instructions,      

      follow up and referral plans. medication usage, Demonstrated understanding of               

      instructions, follow-up care, medications, Prescriptions given X 2.                         

11:00 Patient left the ED.                                                                      

                                                                                                  

Signatures:                                                                                       

Dispatcher [employer]                           EDMS                                                 

Ivan Bautista MD MD cha Smirch, Shelby, RN                      RN                                                      

Ynes Monroy Victoria, RN                    RN   vg1                                                  

                                                                                                  

**************************************************************************************************